# Patient Record
Sex: FEMALE | Race: WHITE
[De-identification: names, ages, dates, MRNs, and addresses within clinical notes are randomized per-mention and may not be internally consistent; named-entity substitution may affect disease eponyms.]

---

## 2019-01-08 ENCOUNTER — HOSPITAL ENCOUNTER (EMERGENCY)
Dept: HOSPITAL 25 - UCCORT | Age: 44
Discharge: HOME | End: 2019-01-08
Payer: COMMERCIAL

## 2019-01-08 VITALS — DIASTOLIC BLOOD PRESSURE: 78 MMHG | SYSTOLIC BLOOD PRESSURE: 137 MMHG

## 2019-01-08 DIAGNOSIS — S70.02XA: Primary | ICD-10-CM

## 2019-01-08 DIAGNOSIS — Z88.1: ICD-10-CM

## 2019-01-08 DIAGNOSIS — F17.210: ICD-10-CM
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
## 2019-01-08 PROCEDURE — G0463 HOSPITAL OUTPT CLINIC VISIT: HCPCS

## 2019-01-08 PROCEDURE — 99211 OFF/OP EST MAY X REQ PHY/QHP: CPT

## 2019-01-08 NOTE — UC
Hip/Pelvis Pain





- HPI Summary


HPI Summary: 





Pt c/o sudden onset of right hip and lateral trunk pain s/p falling from 

standing 3 days ago.  Pt states she had "too much to drink" and slipped and 

fell on right side hip and abdomen. 





- History Of Current Complaint


Chief Complaint: UCGeneralIllness


Stated Complaint: S/P FALL  MID/LOW BACK PAIN


Time Seen by Provider: 19 15:10


Hx Obtained From: Patient


Hx Last Menstrual Period: 19


Pregnant?: No


Onset/Duration: Sudden Onset, Lasting Days, Still Present


Timing: Intermittent Episodes Lasting: - with ceratin movements and positioning


Severity Initially: Moderate


Severity Currently: Mild


Pain Intensity: 0


Location: Discrete At: - right hip and lateral trunk


Character Of Pain: Dull, Aching


Aggravating Factor(s): Movement, Weight Bearing


Alleviating Factor(s): Rest, Position


Associated Signs And Symptoms: Positive: Bruising





- Risk Factors


Septic Arthritis Risk Factor: Negative





- Allergies/Home Medications


Allergies/Adverse Reactions: 


 Allergies











Allergy/AdvReac Type Severity Reaction Status Date / Time


 


shellfish derived Allergy Intermediate Hives Verified 19 15:05


 


azithromycin [From Zithromax] AdvReac  Abdominal Verified 19 15:05





   Pain  











Home Medications: 


 Home Medications





[facility]  19 [History]











PMH/Surg Hx/FS Hx/Imm Hx


Previously Healthy: Yes





- Surgical History


Surgical History: Yes


Surgery Procedure, Year, and Place:  x2





- Family History


Known Family History: Positive: Cardiac Disease





- Social History


Occupation: Employed Full-time


Lives: With Family


Alcohol Use: Occasionally


Substance Use Type: None


Smoking Status (MU): Current Some Day Smoker


Type: Cigarettes


Amount Used/How Often: 1-2 CIGS PER DAY


Have You Smoked in the Last Year: Yes





- Immunization History


Most Recent Tetanus Shot: 





Review of Systems


All Other Systems Reviewed And Are Negative: Yes


Constitutional: Positive: Negative


Skin: Positive: Bruising


Eyes: Positive: Negative


ENT: Positive: Negative


Respiratory: Positive: Negative


Cardiovascular: Positive: Negative


Gastrointestinal: Positive: Negative


Genitourinary: Positive: Negative


Motor: Positive: Negative


Neurovascular: Positive: Negative


Musculoskeletal: Positive: Arthralgia, Myalgia


Neurological: Positive: Negative


Psychological: Positive: Negative


Is Patient Immunocompromised?: No





Physical Exam


Triage Information Reviewed: Yes


Appearance: Well-Appearing


Vital Signs: 


 Initial Vital Signs











Temp  98.1 F   19 15:01


 


Pulse  52   19 15:01


 


Resp  18   19 15:01


 


BP  137/78   19 15:01


 


Pulse Ox  100   19 15:01











Vital Signs Reviewed: Yes


Eye Exam: Normal


ENT Exam: Normal


Dental Exam: Normal


Neck exam: Normal


Respiratory Exam: Normal


Cardiovascular Exam: Normal


Musculoskeletal Exam: Normal


Neurological Exam: Normal


Psychological Exam: Normal


Skin Exam: Other - bruises three small healing on right hip and trunk





Diagnostics





- Radiology


  ** No standard instances


Radiology Interpretation Completed By: Radiologist -  IMPRESSION: No fracture 

is noted. Pelvic ring is intact.





Hip Injury Course/Dx





- Differential Dx/Diagnosis


Differential Diagnosis/HQI/PQRI: Contusion, Fracture


Provider Diagnosis: 


 Contusion of part of trunk








Discharge





- Sign-Out/Discharge


Documenting (check all that apply): Patient Departure


All imaging exams completed and their final reports reviewed: Yes





- Discharge Plan


Condition: Stable


Disposition: HOME


Patient Education Materials:  Contusion in Adults (ED)


Referrals: 


Shameka Kohli [Primary Care Provider] - As Soon As Possible





- Billing Disposition and Condition


Condition: STABLE


Disposition: Home